# Patient Record
Sex: MALE | Race: WHITE
[De-identification: names, ages, dates, MRNs, and addresses within clinical notes are randomized per-mention and may not be internally consistent; named-entity substitution may affect disease eponyms.]

---

## 2017-08-30 ENCOUNTER — HOSPITAL ENCOUNTER (EMERGENCY)
Dept: HOSPITAL 80 - FED | Age: 42
Discharge: HOME | End: 2017-08-30
Payer: COMMERCIAL

## 2017-08-30 VITALS
SYSTOLIC BLOOD PRESSURE: 132 MMHG | HEART RATE: 80 BPM | OXYGEN SATURATION: 95 % | RESPIRATION RATE: 16 BRPM | TEMPERATURE: 97.9 F | DIASTOLIC BLOOD PRESSURE: 80 MMHG

## 2017-08-30 DIAGNOSIS — R00.2: Primary | ICD-10-CM

## 2017-08-30 DIAGNOSIS — E86.9: ICD-10-CM

## 2017-08-30 DIAGNOSIS — Z87.891: ICD-10-CM

## 2017-08-30 LAB
% IMMATURE GRANULYOCYTES: 0.3 % (ref 0–1.1)
ABSOLUTE IMMATURE GRANULOCYTES: 0.02 10^3/UL (ref 0–0.1)
ABSOLUTE NRBC COUNT: 0 10^3/UL (ref 0–0.01)
ADD DIFF?: NO
ADD MORPH?: NO
ADD SCAN?: NO
ANION GAP SERPL CALC-SCNC: 15 MEQ/L (ref 8–16)
ATYPICAL LYMPHOCYTE FLAG: 0 (ref 0–99)
CALCIUM SERPL-MCNC: 9.6 MG/DL (ref 8.5–10.4)
CHLORIDE SERPL-SCNC: 102 MEQ/L (ref 97–110)
CO2 SERPL-SCNC: 19 MEQ/L (ref 22–31)
CREAT SERPL-MCNC: 1.1 MG/DL (ref 0.7–1.3)
ERYTHROCYTE [DISTWIDTH] IN BLOOD BY AUTOMATED COUNT: 11.5 % (ref 11.5–15.2)
FRAGMENT RBC FLAG: 10 (ref 0–99)
GFR SERPL CREATININE-BSD FRML MDRD: > 60 ML/MIN/{1.73_M2}
GLUCOSE SERPL-MCNC: 87 MG/DL (ref 70–100)
HCT VFR BLD CALC: 39.2 % (ref 40–51)
HGB BLD-MCNC: 14.2 G/DL (ref 13.7–17.5)
LEFT SHIFT FLG: 0 (ref 0–99)
LIPEMIA HEMOLYSIS FLAG: 90 (ref 0–99)
MCH RBC BLDCO QN: 31.4 PG (ref 27.9–34.1)
MCHC RBC AUTO-ENTMCNC: 36.2 G/DL (ref 32.4–36.7)
MCV RBC AUTO: 86.7 FL (ref 81.5–99.8)
NRBC-AUTO%: 0 % (ref 0–0.2)
PLATELET # BLD: 223 10^3/UL (ref 150–400)
PLATELET CLUMPS FLAG: 0 (ref 0–99)
PMV BLD AUTO: 9.9 FL (ref 8.7–11.7)
POTASSIUM SERPL-SCNC: 3.4 MEQ/L (ref 3.5–5.2)
RBC # BLD AUTO: 4.52 10^6/UL (ref 4.4–6.38)
SODIUM SERPL-SCNC: 136 MEQ/L (ref 134–144)

## 2017-08-30 NOTE — CPEKG
Heart Rate: 69

RR Interval: 870

P-R Interval: 164

QRSD Interval: 94

QT Interval: 360

QTC Interval: 386

P Axis: 68

QRS Axis: 25

T Wave Axis: 26

EKG Severity - NORMAL ECG -

EKG Impression: SINUS RHYTHM

Electronically Signed By: Zayra Velasquez 30-Aug-2017 22:35:49

## 2017-08-30 NOTE — EDPHY
H & P


Time Seen by Provider: 08/30/17 21:16


HPI/ROS: 


CHIEF COMPLAINT: Palpitations





HISTORY OF PRESENT ILLNESS: The patient is a 42-year-old male presenting with 

palpitations that occurred while at dinner tonight. The patient states his 

heart was racing and pounding. He turned pale and had associated dizziness and 

chest tightness. This lasted for about 15 seconds. The patient states has 

experienced an irregular heart beats with palpitations for the past month. 

Usually he feels this before bed. He describes a thumping sensation followed by 

a pause. Patient notes increase in stress lately due to changing jobs.  He 

denies shortness of breath, chest pain, or lower extremity pain or swelling. 





REVIEW OF SYSTEMS:


A comprehensive 10 point review of systems is otherwise negative aside from 

elements mentioned in the history of present illness.





Past Medical/Surgical History: 





Denies. 


Social History: 





Here with life partner. 


Smoking Status: Former smoker


Physical Exam: 





General Appearance: Alert, pleasant


Eyes: Pupils equal and round, no conjunctival pallor or injection


ENT, Mouth: Mucous membranes moist


Neck: Normal inspection


Respiratory: Lungs are clear to auscultation


Cardiovascular: Regular rate and rhythm 


Gastrointestinal:  Abdomen is soft and non-tender 


Neurological:  A&O, nonfocal, normal gait


Skin:  Warm and dry, no rash


Extremities:  Nontender, no pedal edema


Psychiatric: Mood and affect normal





Constitutional: 


 Initial Vital Signs











Temperature (C)  36.9 C   08/30/17 20:59


 


Heart Rate  81   08/30/17 20:59


 


Respiratory Rate  20   08/30/17 20:59


 


Blood Pressure  145/90 H  08/30/17 20:59


 


O2 Sat (%)  95   08/30/17 20:59








 











O2 Delivery Mode               Room Air














Allergies/Adverse Reactions: 


 





No Known Allergies Allergy (Unverified 08/30/17 20:58)


 








Home Medications: 














 Medication  Instructions  Recorded


 


Elidoni  08/30/17














Medical Decision Making





- Diagnostics


EKG Interpretation: 





EKG interpreted by me reveals normal sinus rhythm, normal axis, normal intervals

, ST and T segments normal.  Interpretation: normal EKG


ED Course/Re-evaluation: 





Patient presents with palpitations that lasted for about 15 seconds. He had 

associated chest pain and shortness of breath. Patient was placed on the 

cardiac monitor. Plan for lab work including CBC and BMP. 





Cardiac monitor revealed normal sinus rhythm throughout.  The patient was 

asymptomatic throughout his emergency department stay.  Patient will follow up 

with Cardiology as an outpatient for Holter monitoring.





Differential Diagnosis: 





Includes though not limited to SVT, atrial fibrillation, ventricular dysrhythmia








- Data Points


Laboratory Results: 


 Laboratory Results





 08/30/17 21:30 





 08/30/17 21:30 





 











  08/30/17 08/30/17





  21:30 21:30


 


WBC    5.95 10^3/uL 10^3/uL





    (3.80-9.50) 


 


RBC    4.52 10^6/uL 10^6/uL





    (4.40-6.38) 


 


Hgb    14.2 g/dL g/dL





    (13.7-17.5) 


 


Hct    39.2 % L %





    (40.0-51.0) 


 


MCV    86.7 fL fL





    (81.5-99.8) 


 


MCH    31.4 pg pg





    (27.9-34.1) 


 


MCHC    36.2 g/dL g/dL





    (32.4-36.7) 


 


RDW    11.5 % %





    (11.5-15.2) 


 


Plt Count    223 10^3/uL 10^3/uL





    (150-400) 


 


MPV    9.9 fL fL





    (8.7-11.7) 


 


Neut % (Auto)    48.8 % %





    (39.3-74.2) 


 


Lymph % (Auto)    40.2 % %





    (15.0-45.0) 


 


Mono % (Auto)    7.7 % %





    (4.5-13.0) 


 


Eos % (Auto)    2.2 % %





    (0.6-7.6) 


 


Baso % (Auto)    0.8 % %





    (0.3-1.7) 


 


Nucleat RBC Rel Count    0.0 % %





    (0.0-0.2) 


 


Absolute Neuts (auto)    2.90 10^3/uL 10^3/uL





    (1.70-6.50) 


 


Absolute Lymphs (auto)    2.39 10^3/uL 10^3/uL





    (1.00-3.00) 


 


Absolute Monos (auto)    0.46 10^3/uL 10^3/uL





    (0.30-0.80) 


 


Absolute Eos (auto)    0.13 10^3/uL 10^3/uL





    (0.03-0.40) 


 


Absolute Basos (auto)    0.05 10^3/uL 10^3/uL





    (0.02-0.10) 


 


Absolute Nucleated RBC    0.00 10^3/uL 10^3/uL





    (0-0.01) 


 


Immature Gran %    0.3 % %





    (0.0-1.1) 


 


Immature Gran #    0.02 10^3/uL 10^3/uL





    (0.00-0.10) 


 


Sodium  136 mEq/L mEq/L  





   (134-144)  


 


Potassium  3.4 mEq/L L mEq/L  





   (3.5-5.2)  


 


Chloride  102 mEq/L mEq/L  





   ()  


 


Carbon Dioxide  19 mEq/l L mEq/l  





   (22-31)  


 


Anion Gap  15 mEq/L mEq/L  





   (8-16)  


 


BUN  18 mg/dL mg/dL  





   (7-23)  


 


Creatinine  1.1 mg/dL mg/dL  





   (0.7-1.3)  


 


Estimated GFR  > 60   





   


 


Glucose  87 mg/dL mg/dL  





   ()  


 


Calcium  9.6 mg/dL mg/dL  





   (8.5-10.4)  











Medications Given: 


 








Discontinued Medications





Sodium Chloride (Ns)  1,000 mls @ 0 mls/hr IV EDNOW ONE; Wide [facility]


   PRN Reason: Protocol


   Stop: 08/30/17 21:17


   Last Admin: 08/30/17 21:30 Dose:  1,000 mls








Departure





- Departure


Disposition: Home, Routine, Self-Care


Clinical Impression: 


 Palpitations





Condition: Good


Instructions:  Palpitations (ED)


Additional Instructions: 


You have been referred to a cardiologist below. Please call tomorrow to 

schedule a followup appointment. 





Return to the Emergency Department with severe chest pain, lightheadedness, 

shortness of breath, or other concerning symptoms. 


Referrals: 


Jam Quijano MD [Medical Doctor] - As per Instructions


Report Scribed for: Zayra Velasquez


Report Scribed by: Alexandra Gundersen


Date of Report: 08/30/17


Time of Report: 21:18


Physician Review and Approval Statement: 





08/30/17 21:18


Portions of this note were transcribed by a medical scribe. I personally 

performed the history, physical exam, and medical decision-making; and 

confirmed the accuracy of the information in the transcribed note.

## 2018-05-28 ENCOUNTER — HOSPITAL ENCOUNTER (EMERGENCY)
Dept: HOSPITAL 80 - FED | Age: 43
Discharge: HOME | End: 2018-05-28
Payer: COMMERCIAL

## 2018-05-28 DIAGNOSIS — H92.03: ICD-10-CM

## 2018-05-28 DIAGNOSIS — Z87.891: ICD-10-CM

## 2018-05-28 DIAGNOSIS — K64.4: Primary | ICD-10-CM

## 2018-05-28 DIAGNOSIS — R07.89: ICD-10-CM

## 2018-05-28 LAB — PLATELET # BLD: 242 10^3/UL (ref 150–400)

## 2018-05-28 NOTE — CPEKG
Heart Rate: 72

RR Interval: 833

P-R Interval: 144

QRSD Interval: 88

QT Interval: 372

QTC Interval: 408

P Axis: 53

QRS Axis: 3

T Wave Axis: 34

EKG Severity - NORMAL ECG -

EKG Impression: SINUS RHYTHM

Electronically Signed By: Jam Quijano 30-May-2018 11:47:54

## 2018-05-28 NOTE — EDPHY
H & P


Stated Complaint: blood in stool, chest tightness


Time Seen by Provider: 05/28/18 21:59


HPI/ROS: 





HPI


The patient presents with multiple complaints, most prominent and concerning to 

him is bright red blood per rectum which occurred at about 2:00 p.m. This 

afternoon while at the [place] airport.  He was returning from a trip where he 

visited his family in [place].  He did drink some alcohol on this trip, however 

was not camping.  He had diarrhea 2 days ago, yesterday felt fine, earlier today

, had diffuse mild abdominal discomfort which improved after eating a meal.  

Then had a single episode of bright red blood in toilet bowl and small blood 

clot when he white.  This was not painful, not associated with any straining.  

He had 1 prior episode of this many years ago.  He does have a familial history 

of polyps and has been seen by Dr. Jones of GI for this.  His last colonoscopy 

was 5 years ago and was normal.  He is due for screening colonoscopy in the 

coming months.  He has not had any fevers or chills, not been camping recently, 

not had any nausea or vomiting.  He denies any dark or tarry stools recently.





He also has chest tightness and bilateral ear pain, however the symptoms are 

subacute.  His chest tightness has been evaluated by his primary care doctor.  

He also had an echocardiogram performed in the last 1 year by Cardiology 

because of chest tightness and palpitations, he reports this was normal.  He 

has been taking a PPI for the last several weeks with mild improvement in his 

symptoms.  He describes a tight sensation throughout his chest as well as a 

gurgling sensation, this is been associated with a sore throat which has 

somewhat improved with PPI.  He also has bilateral ear pain and pressure, not 

improved with the loratadine or amoxicillin, 12 day course.





REVIEW OF SYSTEMS


Constitutional:  No fever, no chills.


Eyes:  No discharge.


ENT:  No sore throat.


Cardiovascular:  Chest tightness, no palpitations.


Respiratory:  No cough, no shortness of breath.


Gastrointestinal:  See HPI


Genitourinary:  No hematuria.


Musculoskeletal:  No back pain.


Skin:  No rashes.


Neurological:  No headache.





PMHx:  Healthy





Soc Hx:  Housed





FHx:  Colon polyps





PHYSICAL


General Appearance: Alert, no distress


Eyes: Pupils equal and round no pallor or injection


ENT, Mouth:  TMs clear bilaterally, no tragal tenderness Mucous membranes moist

, posterior pharynx unremarkable


Respiratory: There are no retractions, lungs are clear to auscultation, no 

chest wall tenderness


Cardiovascular:  Regular rate and rhythm 


Gastrointestinal:  Abdomen is soft and non-tender, no masses, bowel sounds 

normal 


Rectal:  External hemorrhoids with hemorrhoid at 3 o'clock which is slightly 

inflamed with abraded epidermis, there are no rectal masses, there is no stool 

or blood in the rectal vault


Neurological:  A&O, moves all extremities


Skin:  Warm and dry, no rashes


Musculoskeletal: Neck is supple non tender 


Extremities:  symmetrical, full range of motion 


Psychiatric:  Patient is oriented X 3, there is no agitation 





Source: Patient


Exam Limitations: No limitations





- Personal History


Current Tetanus/Diphtheria Vaccine: Yes


Tetanus Vaccine Date: 2013





- Medical/Surgical History


Hx Asthma: No


Hx Chronic Respiratory Disease: No


Hx Diabetes: No


Hx Cardiac Disease: No


Hx Renal Disease: No


Hx Cirrhosis: No


Hx Alcoholism: No


Hx HIV/AIDS: No


Hx Splenectomy or Spleen Trauma: No


Other PMH: EYE SURG, ANAL FISSURE SURG X2, ANXIETY, GERD





- Social History


Smoking Status: Former smoker


Constitutional: 


 Initial Vital Signs











Heart Rate  66   05/28/18 21:50


 


Respiratory Rate  16   05/28/18 21:50


 


Blood Pressure  133/93 H  05/28/18 21:50


 


O2 Sat (%)  95   05/28/18 21:50








 











O2 Delivery Mode               Room Air














Allergies/Adverse Reactions: 


 





No Known Allergies Allergy (Unverified 08/30/17 20:58)


 








Home Medications: 














 Medication  Instructions  Recorded


 


Elidel  08/30/17














Medical Decision Making





- Diagnostics


EKG Interpretation: 





EKG:  Complete interpretation has been separately recorded in the [facility] 

archive.  Summary impression:  Normal sinus rhythm





Imaging Results: 


 Imaging Impressions





Chest/Thorax CTA  05/28/18 23:22


Impression:


1. No definite pulmonary thromboemboli.


2. No acute pulmonary disease.


 


 


 


Findings and recommendations discussed with Emergency Department physician, 

Frida Moe MD  at  23:47 hour, 5/28/2018.


 


Final report concurs with initial preliminary interpretation. 


 


A test result has been communicated to a licensed care provider and documented 

in the [facility] Critical Result system on 5/28/2018 23:49, Message ID 

[id number].











Imaging: Discussed imaging studies w/ On call Radiologist


Differential Diagnosis: 





This is a 43-year-old healthy male who presents with an episode of bright red 

blood per rectum occurring about 8 hr prior to presentation.  This is an 

isolated episode, though in the setting of diarrhea over the last 2 days.  On 

exam, he does have external hemorrhoids, with evidence of denuded skin 

overlying these to suggest bleeding external hemorrhoid, which could of been 

provoked from diarrhea.  I have also considered colitis, AVM, colon malignancy.

, however I feel these are much less likely.





In regards to his ear pain, given his symptoms did not improve with a course of 

amoxicillin, he does not have any signs of congestion, his symptoms did not 

become worse with air plane flight, I am unsure what is causing his symptoms.  

I doubt sinister causes.  I plan to refer him to ENT.





His chest tightness has been ongoing for months at this point.  Not improve 

much with PPI.  I would consider pulmonary embolism as the cause of his symptoms

, and because of this I will send a D-dimer, however I think this is unlikely.  





In the emergency department, labs were checked and were unremarkable except for 

a D-dimer which was positive.  I discussed this with the patient and we have 

decided to move forward with CT scan of his chest to evaluate for PE.





CT scan of the chest demonstrated no PE and no other acute findings to explain 

the patient's chest tightness.  I discussed this result with the patient.





GERD is a consideration.  I have encouraged him to continue taking his PPI.  We 

have discussed foods to avoid.  He has plans to follow up with Dr. Jones of GI 

and I will encourage him to discuss this chest pain with him.





- Data Points


Laboratory Results: 


 Laboratory Results





 05/28/18 22:00 





 05/28/18 22:00 





 











  05/28/18 05/28/18 05/28/18





  22:00 22:00 22:00


 


WBC      6.46 10^3/uL 10^3/uL





     (3.80-9.50) 


 


RBC      5.13 10^6/uL 10^6/uL





     (4.40-6.38) 


 


Hgb      16.0 g/dL g/dL





     (13.7-17.5) 


 


Hct      44.4 % %





     (40.0-51.0) 


 


MCV      86.5 fL fL





     (81.5-99.8) 


 


MCH      31.2 pg pg





     (27.9-34.1) 


 


MCHC      36.0 g/dL g/dL





     (32.4-36.7) 


 


RDW      11.9 % %





     (11.5-15.2) 


 


Plt Count      242 10^3/uL 10^3/uL





     (150-400) 


 


MPV      9.7 fL fL





     (8.7-11.7) 


 


Neut % (Auto)      51.9 % %





     (39.3-74.2) 


 


Lymph % (Auto)      35.4 % %





     (15.0-45.0) 


 


Mono % (Auto)      8.5 % %





     (4.5-13.0) 


 


Eos % (Auto)      3.3 % %





     (0.6-7.6) 


 


Baso % (Auto)      0.6 % %





     (0.3-1.7) 


 


Nucleat RBC Rel Count      0.0 % %





     (0.0-0.2) 


 


Absolute Neuts (auto)      3.35 10^3/uL 10^3/uL





     (1.70-6.50) 


 


Absolute Lymphs (auto)      2.29 10^3/uL 10^3/uL





     (1.00-3.00) 


 


Absolute Monos (auto)      0.55 10^3/uL 10^3/uL





     (0.30-0.80) 


 


Absolute Eos (auto)      0.21 10^3/uL 10^3/uL





     (0.03-0.40) 


 


Absolute Basos (auto)      0.04 10^3/uL 10^3/uL





     (0.02-0.10) 


 


Absolute Nucleated RBC      0.00 10^3/uL 10^3/uL





     (0-0.01) 


 


Immature Gran %      0.3 % %





     (0.0-1.1) 


 


Immature Gran #      0.02 10^3/uL 10^3/uL





     (0.00-0.10) 


 


D-Dimer  0.59 ug/mLFEU H ug/mLFEU    





   (0.00-0.50)   


 


Sodium    142 mEq/L mEq/L  





    (135-145)  


 


Potassium    4.0 mEq/L mEq/L  





    (3.3-5.0)  


 


Chloride    102 mEq/L mEq/L  





    ()  


 


Carbon Dioxide    27 mEq/l mEq/l  





    (22-31)  


 


Anion Gap    13 mEq/L mEq/L  





    (8-16)  


 


BUN    15 mg/dL mg/dL  





    (7-23)  


 


Creatinine    0.9 mg/dL mg/dL  





    (0.7-1.3)  


 


Estimated GFR    > 60   





    


 


Glucose    78 mg/dL mg/dL  





    ()  


 


Calcium    9.2 mg/dL mg/dL  





    (8.5-10.4)  


 


Total Bilirubin    0.4 mg/dL mg/dL  





    (0.1-1.4)  


 


AST    28 IU/L IU/L  





    (17-59)  


 


ALT    42 IU/L IU/L  





    (21-72)  


 


Alkaline Phosphatase    57 IU/L IU/L  





    ()  


 


Troponin I    < 0.012 ng/mL ng/mL  





    (0.000-0.034)  


 


Total Protein    7.8 g/dL g/dL  





    (6.3-8.2)  


 


Albumin    4.7 g/dL g/dL  





    (3.5-5.0)  














Departure





- Departure


Disposition: Home, Routine, Self-Care


Clinical Impression: 


 Bright red blood per rectum, External hemorrhoid, bleeding, Ear pain, Chest 

tightness





Condition: Good


Instructions:  Hemorrhoids (ED)


Additional Instructions: 


I recommend that you monitor your bowel movements at home.  If you have any 

increasing rectal bleeding, you should follow up with your primary care doctor 

or your gastroenterologist.  I have given you information for the Ear Nose and 

Throat doctor Dr. Hennessy to make an appointment with if your ear pain 

continues.  Please return to the emergency department if your worse in any way.


Referrals: 


Lakshmi Mathews MD [Primary Care Provider] - As per Instructions


Markus Jones MD [[facility] Primary Care Provider] - As per Instructions


David Hennessy MD [Medical Doctor] - As per Instructions

## 2018-05-29 VITALS — DIASTOLIC BLOOD PRESSURE: 86 MMHG | SYSTOLIC BLOOD PRESSURE: 124 MMHG

## 2018-09-05 ENCOUNTER — HOSPITAL ENCOUNTER (OUTPATIENT)
Dept: HOSPITAL 80 - BMCIMAGING | Age: 43
End: 2018-09-05
Attending: ORTHOPAEDIC SURGERY
Payer: COMMERCIAL

## 2018-09-05 DIAGNOSIS — M25.511: Primary | ICD-10-CM

## 2019-06-03 ENCOUNTER — HOSPITAL ENCOUNTER (EMERGENCY)
Dept: HOSPITAL 80 - FED | Age: 44
Discharge: HOME | End: 2019-06-03
Payer: COMMERCIAL